# Patient Record
(demographics unavailable — no encounter records)

---

## 2024-10-15 NOTE — ASSESSMENT
[FreeTextEntry1] : Right biceps tendon sheath injection was performed because of pain and inflammation Anesthesia: ethyl chloride sprayed topically Celestone 6mg: An injection of Celestone 1cc Lidocaine: An injection of Lidocaine 1% 1cc Marcaine: An injection of Marcaine 0.5% 1cc   The risks, benefits, and alternatives to cortisone injection were explained in full to the patient. Risks outlined include but are not limited to infection, sepsis, bleeding, scarring, skin discoloration, temporary increase in pain, syncopal episode, failure to resolve symptoms, allergic reaction, symptom recurrence, and elevation of blood sugar in diabetics. Patient understood the risks. All questions were answered. After discussion of options, patient verbally consented to an injection. Sterile prep was done of the injection site. Patient tolerated the procedure well. Advised to ice the injection site this evening.   Right lateral epicondyle injection was performed because of pain and inflammation Anesthesia: ethyl chloride sprayed topically Celestone 6mg: An injection of Celestone 2 cc Lidocaine: An injection of Lidocaine 1% 2 cc Marcaine: An injection of Marcaine 0.5% 2 cc   The risks, benefits, and alternatives to cortisone injection were explained in full to the patient. Risks outlined include but are not limited to infection, sepsis, bleeding, scarring, skin discoloration, temporary increase in pain, syncopal episode, failure to resolve symptoms, allergic reaction, symptom recurrence, and elevation of blood sugar in diabetics. Patient understood the risks. All questions were answered. After discussion of options, patient verbally consented to an injection. Sterile prep was done of the injection site. Patient tolerated the procedure well. Advised to ice the injection site this evening.   Activity modification as tolerated Ice as needed NSAIDs as needed Return prn

## 2024-10-15 NOTE — PHYSICAL EXAM
[de-identified] : R elbow: mild swelling Tender lateral epicondyle Pain with ROM Pain with forced wrist extension Tender distal biceps Biceps tendon intact

## 2024-10-15 NOTE — HISTORY OF PRESENT ILLNESS
[de-identified] : R lateral and anterior elbow pain  Since April  I independently reviewed the MRI and my interpretation is lateral epicondylitis, biceps tendonitis  [7] : 7 [Dull/Aching] : dull/aching [] : yes [FreeTextEntry1] : R elbow [FreeTextEntry5] : R elbow pain from April 2024 wears a brace no injury [de-identified] : mri

## 2025-02-07 NOTE — PHYSICAL EXAM
[de-identified] : R elbow: mild swelling Tender lateral epicondyle Pain with ROM Pain with forced wrist extension Tender distal biceps Biceps tendon intact

## 2025-02-07 NOTE — REASON FOR VISIT
[FreeTextEntry2] :   02/07/2025:  56 year old male here today for:  Lateral epicondylitis of right elbow and  Biceps tendinitis of right upper extremity.

## 2025-02-07 NOTE — ASSESSMENT
[FreeTextEntry1] : Elbow tendonitis This is an acute uncomplicated injury that needs injection, NSIADS, therapy   I recommend the patient take over the counter medication such as Advil/Motrin/Aleve or Tylenol for pain and inflammation HEP  R distal biceps tendon sheath injection was performed because of pain and inflammation under aseptic conditions with betadine and alcohol Anesthesia: ethyl chloride sprayed topically Celestone 6mg/1cc: An injection of Celestone 1cc Lidocaine: An injection of Lidocaine 1% 1cc Marcaine: An injection of Marcaine 0.5% 1cc 25G needle   The risks, benefits, and alternatives to cortisone injection were explained in full to the patient. Risks outlined include but are not limited to infection, sepsis, bleeding, scarring, skin discoloration, temporary increase in pain, syncopal episode, failure to resolve symptoms, allergic reaction, symptom recurrence, and elevation of blood sugar in diabetics. Patient understood the risks. All questions were answered. After discussion of options, patient verbally consented to an injection. Sterile prep was done of the injection site. Patient tolerated the procedure well. Advised to ice the injection site this evening.      R lateral epicondyle tendon origin injection was performed because of pain and inflammation under aseptic conditions with betadine and alcohol Anesthesia: ethyl chloride sprayed topically Celestone 6mg/1cc: An injection of Celestone 2 cc Lidocaine: An injection of Lidocaine 1% 2 cc Marcaine: An injection of Marcaine 0.5% 2 cc 25G needle     The risks, benefits, and alternatives to cortisone injection were explained in full to the patient. Risks outlined include but are not limited to infection, sepsis, bleeding, scarring, skin discoloration, temporary increase in pain, syncopal episode, failure to resolve symptoms, allergic reaction, symptom recurrence, and elevation of blood sugar in diabetics. Patient understood the risks. All questions were answered. After discussion of options, patient verbally consented to an injection. Sterile prep was done of the injection site. Patient tolerated the procedure well. Advised to ice the injection site this evening.

## 2025-02-07 NOTE — HISTORY OF PRESENT ILLNESS
[6] : 6 [4] : 4 [Dull/Aching] : dull/aching [de-identified] : R lateral epicondylitis and biceps tendontis Injeciton in Oct helped until recently